# Patient Record
(demographics unavailable — no encounter records)

---

## 2024-11-26 NOTE — PHYSICAL EXAM
[General Appearance - In No Acute Distress] : no acute distress [Normal Conjunctiva] : the conjunctiva exhibited no abnormalities [Normal Oral Mucosa] : normal oral mucosa [Normal Oropharynx] : normal oropharynx [Auscultation Breath Sounds / Voice Sounds] : lungs were clear to auscultation bilaterally [Normal Rate] : normal [Rhythm Regular] : regular [Normal S1] : normal S1 [Normal S2] : normal S2 [II] : a grade 2 [IV] : a grade 4 [Abdomen Soft] : soft [Abdomen Tenderness] : non-tender [Abnormal Walk] : normal gait [Nail Clubbing] : no clubbing of the fingernails [Cyanosis, Localized] : no localized cyanosis [Skin Color & Pigmentation] : normal skin color and pigmentation [Oriented To Time, Place, And Person] : oriented to person, place, and time [Affect] : the affect was normal [FreeTextEntry1] : No JVD, no carotid bruit. [S3] : no S3 [S4] : no S4

## 2024-11-26 NOTE — HISTORY OF PRESENT ILLNESS
[FreeTextEntry1] : Patient presents back to the office today feeling very well and offering no complaints.  She did have an issue back over the summer when she had wisdom teeth pulled that she had excessive bleeding.  She wonders if this could be related to valsartan.  She has not really been checking her blood pressure at home.  She does continue on the valsartan at this time.  Patient denies chest pain, shortness of breath, palpitations, orthopnea, presyncope, syncope.

## 2024-11-26 NOTE — ASSESSMENT
[FreeTextEntry1] : Exercise stress test January 14, 2020 during which the patient exercised via a Kraig protocol for a 5 minutes and 20 seconds, totaling 6 METs. Peak heart rate achieved was 130 beats per minute, representing 91% of age-predicted maximum. Blood pressure response was normal. EKG showed no evidence of ischemia.  Echocardiogram February 8, 2021 demonstrated left ventricle borderline increased in size with normal function with ejection fraction of 55 to 60%. Diastolic dysfunction is noted with elevated left atrial pressures. Mild biatrial enlargement. Severe aortic insufficiency is again noted. Mild to moderate tricuspid and trace pulmonic insufficiencies.  Echocardiogram January 19, 2022 demonstrated left ventricle normal size and function with ejection fraction of 60 to 65%. Mildly dilated left atrium. Severe aortic regurgitation. Trace mitral and mild to moderate tricuspid regurgitation. Questionable PFO noted on the study.  Echocardiogram January 17, 2023 demonstrated left ventricle normal size and function with ejection fraction of 60 to 65%.  Mildly dilated left atrium.  Severe aortic insufficiency with mild aortic stenosis.  Trace mitral regurgitation.  Moderate tricuspid regurgitation.  Mild pulmonic insufficiency.  Echocardiogram January 24, 2024 demonstrated left ventricle normal size and function with ejection fraction of 60 to 65%.  Left atrium mildly dilated.  Moderate-severe aortic regurgitation.  Trace mitral mild tricuspid regurgitation.  EKG: Sinus rhythm with no significant ST or T wave changes.  80-year-old female with a past medical history of severe aortic insufficiency, PFO, tricuspid insufficiency, breast cancer, hypertension who comes for followup. Blood pressure continues appear to be controlled, but she should be checking at home.  EKG today is unremarkable.  She should have repeat echocardiogram to reevaluate especially her LV size and function in the setting of her aortic insufficiency.  She continues to not want to consider any further intervention or workup but will continue to have echocardiograms and if there is ever signs of more significant LV issues we will have to reconsider aortic valve intervention.

## 2024-12-02 NOTE — PHYSICAL EXAM
[] : valgus deformity [Left] : left knee [AP] : anteroposterior [Lateral] : lateral [advanced tricompartmental OA with lateral compartment narrowing and valgus alignment] : advanced tricompartmental OA with lateral compartment narrowing and valgus alignment

## 2024-12-02 NOTE — PROCEDURE
[Large Joint Injection] : Large joint injection [Left] : of the left [Knee] : knee [Pain] : pain [Alcohol] : alcohol [Betadine] : betadine [Ethyl Chloride sprayed topically] : ethyl chloride sprayed topically [Sterile technique used] : sterile technique used [Orthovisc (30mg)] : 30mg of Orthovisc [] : Patient tolerated procedure well [Call if redness, pain or fever occur] : call if redness, pain or fever occur [Apply ice for 15min out of every hour for the next 12-24 hours as tolerated] : apply ice for 15 minutes out of every hour for the next 12-24 hours as tolerated [Patient was advised to rest the joint(s) for ____ days] : patient was advised to rest the joint(s) for [unfilled] days [Previous OTC use and PT nontherapeutic] : patient has tried OTC's including aspirin, Ibuprofen, Aleve, etc or prescription NSAIDS, and/or exercises at home and/or physical therapy without satisfactory response [Patient had decreased mobility in the joint] : patient had decreased mobility in the joint [Risks, benefits, alternatives discussed / Verbal consent obtained] : the risks benefits, and alternatives have been discussed, and verbal consent was obtained [#1] : series #1

## 2024-12-02 NOTE — HISTORY OF PRESENT ILLNESS
[Gradual] : gradual [7] : 7 [0] : 0 [Sharp] : sharp [Frequent] : frequent [de-identified] :  12/02/2024: Known left knee OA did well with Orthovisc injections completed 2/29/2024.  She states the injections helped in regards to her level of pain and ability to tolerate ADLs for greater than 6 months.  Recently pain has been returning and she would like to start another series  02/29/2024: Here for continued orthovisc #4  3-13-23- for continued orthovisc #4 left knee  This is Ms. JUAN LUIS QUINTANA  a 79 year old female who comes in today complaining of left knee pain.  She completed orthovisc injections in march 2022 and it helped until recently.    Patient Complaint - 3/3/22- For Orthovisc #4 left knee 2/24/22-For Orthovisc #3 left knee 2/17/22- For Orthovisc #2 left knee 2/10/22- Wants to start another viscoinjection series for left knee, did well until recently 5/14/2021: Pt here for Orthovisc #4 to the left knee. Pt reports appx 90% pain relief. [] : no

## 2024-12-09 NOTE — HISTORY OF PRESENT ILLNESS
[Gradual] : gradual [7] : 7 [0] : 0 [Sharp] : sharp [Frequent] : frequent [de-identified] :   12/09/2024: For continued Orthovisc injections to the left knee  #2 02/29/2024: Here for continued orthovisc #4  3-13-23- for continued orthovisc #4 left knee  This is Ms. JUAN LUIS QUINTANA  a 79 year old female who comes in today complaining of left knee pain.  She completed orthovisc injections in march 2022 and it helped until recently.    Patient Complaint - 3/3/22- For Orthovisc #4 left knee 2/24/22-For Orthovisc #3 left knee 2/17/22- For Orthovisc #2 left knee 2/10/22- Wants to start another viscoinjection series for left knee, did well until recently 5/14/2021: Pt here for Orthovisc #4 to the left knee. Pt reports appx 90% pain relief. [] : no

## 2024-12-16 NOTE — PROCEDURE
[Large Joint Injection] : Large joint injection [Left] : of the left [Knee] : knee [Pain] : pain [Alcohol] : alcohol [Betadine] : betadine [Ethyl Chloride sprayed topically] : ethyl chloride sprayed topically [Sterile technique used] : sterile technique used [Orthovisc (30mg)] : 30mg of Orthovisc [#3] : series #3 [] : Patient tolerated procedure well [Call if redness, pain or fever occur] : call if redness, pain or fever occur [Apply ice for 15min out of every hour for the next 12-24 hours as tolerated] : apply ice for 15 minutes out of every hour for the next 12-24 hours as tolerated [Patient was advised to rest the joint(s) for ____ days] : patient was advised to rest the joint(s) for [unfilled] days [Previous OTC use and PT nontherapeutic] : patient has tried OTC's including aspirin, Ibuprofen, Aleve, etc or prescription NSAIDS, and/or exercises at home and/or physical therapy without satisfactory response [Patient had decreased mobility in the joint] : patient had decreased mobility in the joint [Risks, benefits, alternatives discussed / Verbal consent obtained] : the risks benefits, and alternatives have been discussed, and verbal consent was obtained

## 2024-12-16 NOTE — HISTORY OF PRESENT ILLNESS
[de-identified] : 12/16/2024 Orthovisc 3 12/09/2024: For continued Orthovisc injections to the left knee  #2 02/29/2024: Here for continued orthovisc #4  3-13-23- for continued orthovisc #4 left knee  This is Ms. JUAN LUIS QUINTANA  a 79 year old female who comes in today complaining of left knee pain.  She completed orthovisc injections in march 2022 and it helped until recently.    Patient Complaint - 3/3/22- For Orthovisc #4 left knee 2/24/22-For Orthovisc #3 left knee 2/17/22- For Orthovisc #2 left knee 2/10/22- Wants to start another viscoinjection series for left knee, did well until recently 5/14/2021: Pt here for Orthovisc #4 to the left knee. Pt reports appx 90% pain relief. [Gradual] : gradual [7] : 7 [0] : 0 [Sharp] : sharp [Frequent] : frequent [] : no

## 2024-12-23 NOTE — PROCEDURE
[Large Joint Injection] : Large joint injection [Left] : of the left [Knee] : knee [Pain] : pain [Alcohol] : alcohol [Betadine] : betadine [Ethyl Chloride sprayed topically] : ethyl chloride sprayed topically [Sterile technique used] : sterile technique used [Orthovisc (30mg)] : 30mg of Orthovisc [] : Patient tolerated procedure well [Call if redness, pain or fever occur] : call if redness, pain or fever occur [Apply ice for 15min out of every hour for the next 12-24 hours as tolerated] : apply ice for 15 minutes out of every hour for the next 12-24 hours as tolerated [Patient was advised to rest the joint(s) for ____ days] : patient was advised to rest the joint(s) for [unfilled] days [Previous OTC use and PT nontherapeutic] : patient has tried OTC's including aspirin, Ibuprofen, Aleve, etc or prescription NSAIDS, and/or exercises at home and/or physical therapy without satisfactory response [Patient had decreased mobility in the joint] : patient had decreased mobility in the joint [Risks, benefits, alternatives discussed / Verbal consent obtained] : the risks benefits, and alternatives have been discussed, and verbal consent was obtained [#4] : series #4

## 2024-12-23 NOTE — HISTORY OF PRESENT ILLNESS
[Gradual] : gradual [7] : 7 [0] : 0 [Sharp] : sharp [Frequent] : frequent [de-identified] :  12/23/2024:  Orthovisc 4 left knee  12/09/2024: For continued Orthovisc injections to the left knee  #2 02/29/2024: Here for continued orthovisc #4  3-13-23- for continued orthovisc #4 left knee  This is Ms. JUAN LUIS QUINTANA  a 79 year old female who comes in today complaining of left knee pain.  She completed orthovisc injections in march 2022 and it helped until recently.    Patient Complaint - 3/3/22- For Orthovisc #4 left knee 2/24/22-For Orthovisc #3 left knee 2/17/22- For Orthovisc #2 left knee 2/10/22- Wants to start another viscoinjection series for left knee, did well until recently 5/14/2021: Pt here for Orthovisc #4 to the left knee. Pt reports appx 90% pain relief. [] : no

## 2025-02-17 NOTE — PHYSICAL EXAM
[Right] : right knee [5___] : hamstring 5[unfilled]/5 [NL (0)] : extension 0 degrees [Negative] : negative Grabiel's [] : negative Lachmann [TWNoteComboBox7] : flexion 120 degrees

## 2025-02-17 NOTE — ASSESSMENT
[FreeTextEntry1] : Has advanced OA, pain resolved with Tylenol for arthritis Will hold off on any injections until pain not controlled with medications

## 2025-02-17 NOTE — HISTORY OF PRESENT ILLNESS
[Dull/Aching] : dull/aching [Localized] : localized [Occasional] : occasional [de-identified] : 2/17/25: 80yo F here with right knee pain that began on 2/15. She does not recall any trauma, experienced pain when she stood up to go to the bathroom. She did not feel pain yesterday, just the one instance on Saturday. has been receiving injections for left knee for a few years, no prior hx or tx of right knee issues [] : no [FreeTextEntry1] : right knee [FreeTextEntry3] : 2/15/25

## 2025-02-17 NOTE — IMAGING
[Right] : right knee [AP] : anteroposterior [Lateral] : lateral [advanced tricompartmental OA with lateral compartment narrowing and valgus alignment] : advanced tricompartmental OA with lateral compartment narrowing and valgus alignment